# Patient Record
(demographics unavailable — no encounter records)

---

## 2025-01-14 NOTE — DISCUSSION/SUMMARY
[FreeTextEntry1] : Patient is status post prolonged admission after a lower extremity fracture she has had progressive neurological deterioration and has developed a fairly significant decubitus ulcer stage IV by physical exam and that was complicated by an osteomyelitis which required prolonged IV antibiotics.  She is currently discharged back at home she was seen by a local physician who made a housecall and found that she had some crackles bilaterally and placed her on oral antibiotics she still has some of those crackles and does have some component of aspiration based on history.  Pureed diet was recommended to the family with the addition of Thick-It to her meals.  Repeat x-ray will be ordered.  Blood count and white cell counts are pending.  Patient has significant decubitus ulcer, stage 4, 10 cm x 7cm. Which I do not think will necessarily heal simply with wound care and may need a skin flap which the family currently refuses.  [facility] referral will be made. Patient needs wet to dry dressings with saline 3x a week. Patient will need a full electric hospital bed with air mattress to help with ongoing wound care of her severe decubitus ulcer (stage 4, 10cm x 7cm).  70 minutes was spent with patient and 2 daughters handling a myriad of medical issues

## 2025-01-14 NOTE — ADDENDUM
[FreeTextEntry1] : Aaron Díaz assisted in documentation on 09/11/24 acting as a scribe for Dr. Timoteo Atkinson.

## 2025-01-14 NOTE — ASSESSMENT
[FreeTextEntry1] : Cough variant asthma (493.82) (J45.991) Diabetes mellitus (250.00) (E11.9) Bronchitis (490) (J40) Decubitus   sporadic Hematuria w ni sign of UTI   Lifestyle changes for control of BP reviewed ie wgt reduction, salt restriction, Etoh avoidance, exercise 30 min aerobic activity per day, avoid NSAID use self monitor BP TIW Lifestyle advice for LDL reduction including reduction of red meat consumption concentrating on a plant based diet. 30 min aerobic exercise per day. Calorie reduction to target BMI<25

## 2025-01-14 NOTE — REVIEW OF SYSTEMS
[Feeling Fatigued] : feeling fatigued [SOB] : shortness of breath [Dyspnea on exertion] : dyspnea during exertion [Cough] : cough [Wheezing] : wheezing [Negative] : Musculoskeletal [Coughing Up Blood] : no hemoptysis [Snoring] : no snoring

## 2025-01-14 NOTE — HISTORY OF PRESENT ILLNESS
[FreeTextEntry1] : 05/13/22 Patient is a 94-year-old female with prior history of hypertension as well as progressive Parkinson's disease with moderate dementia. Patient has had intermittent history of postprandial aspiration causing intermittent cough and lung findings on exam. Patient for the last week has had intermittent croupy like cough with mild shortness of breath no fever were reported appetite is near about normal.   03/01/23  3/21/24 progressive lethargy over last 48 hours has no wet diaper  very lethargic and nodding off  poor mental status po inatke marginal   3/25/24 was in [facility] for vent failure high Co2 and low O2 was azotemic rx w IV fluids and brief time on bipap mask  was d/c 3/24 /24  still has mild wheeze   4/8/24 was readmitted to [facility] for hypercapnia and hypoxia no UTI or PNA noted  on Seroquel 12.5 qhs  has difficulty w forced air therapy  on 4  l nasal o2  mod dementia  taking PO well no apparent aspiration  09/11/24 over summer she was admitted s/p fall tibia fx and went to [facility]  in [place]  was in [facility] x 2 m and fx healed but developed decubitus ulcer and osteo of sacrum  needed IV abx 2 week s  still has wound care  needs [facility] home care ref for dressing management w saline pads good oral intake  has been home 6 weeks  better mental status  had recent home visit by MD was given dione for PNA / bronchitis  1/14/25 home bound due to OMS and has large sacral ulcer  had received home care since summer  was d/c today  still needs wound care

## 2025-01-14 NOTE — PHYSICAL EXAM
[Lethargic] : lethargic [No Clubbing] : no clubbing [No Varicosities] : no varicosities [Cognitive Impairment] : cognitive impairment [Appears Anxious] : appears anxious [Ill-Appearing] : ill-appearing [de-identified] : in bed unresponsive  [de-identified] : residual decubitus ulcer